# Patient Record
Sex: FEMALE | URBAN - METROPOLITAN AREA
[De-identification: names, ages, dates, MRNs, and addresses within clinical notes are randomized per-mention and may not be internally consistent; named-entity substitution may affect disease eponyms.]

---

## 2022-08-24 ENCOUNTER — OFFICE VISIT (OUTPATIENT)
Dept: URBAN - METROPOLITAN AREA CLINIC 88 | Facility: CLINIC | Age: 37
End: 2022-08-24
Payer: COMMERCIAL

## 2022-08-24 DIAGNOSIS — H52.223 REGULAR ASTIGMATISM, BILATERAL: ICD-10-CM

## 2022-08-24 DIAGNOSIS — R51.9 HEADACHE, UNSPECIFIED: Primary | ICD-10-CM

## 2022-08-24 PROCEDURE — 92004 COMPRE OPH EXAM NEW PT 1/>: CPT | Performed by: OPTOMETRIST

## 2022-08-24 ASSESSMENT — INTRAOCULAR PRESSURE
OS: 13
OD: 13

## 2022-08-24 ASSESSMENT — VISUAL ACUITY
OD: 20/20
OS: 20/25

## 2022-08-24 ASSESSMENT — KERATOMETRY
OD: 42.38
OS: 43.00

## 2022-08-24 NOTE — IMPRESSION/PLAN
Impression: Headache, unspecified: R51.9. Plan: Reviewed headache concerns with patient in detail. No ocular or visual abnormalities found during examination attributed to headache origin, except for ametropia. After 1 month of adapting to new glasses if headaches continue RTC for VF 30-2 and/or follow up with Primary Care Physician.